# Patient Record
Sex: MALE | Race: BLACK OR AFRICAN AMERICAN | NOT HISPANIC OR LATINO | Employment: FULL TIME | URBAN - METROPOLITAN AREA
[De-identification: names, ages, dates, MRNs, and addresses within clinical notes are randomized per-mention and may not be internally consistent; named-entity substitution may affect disease eponyms.]

---

## 2017-04-05 ENCOUNTER — HOSPITAL ENCOUNTER (EMERGENCY)
Facility: HOSPITAL | Age: 27
Discharge: HOME | End: 2017-04-05
Attending: EMERGENCY MEDICINE

## 2017-04-05 VITALS
WEIGHT: 255 LBS | HEIGHT: 75 IN | OXYGEN SATURATION: 97 % | BODY MASS INDEX: 31.71 KG/M2 | RESPIRATION RATE: 18 BRPM | DIASTOLIC BLOOD PRESSURE: 83 MMHG | SYSTOLIC BLOOD PRESSURE: 147 MMHG | HEART RATE: 73 BPM | TEMPERATURE: 97 F

## 2017-04-05 DIAGNOSIS — R36.9 PENILE DISCHARGE: ICD-10-CM

## 2017-04-05 DIAGNOSIS — G43.909 MIGRAINE WITHOUT STATUS MIGRAINOSUS, NOT INTRACTABLE, UNSPECIFIED MIGRAINE TYPE: Primary | ICD-10-CM

## 2017-04-05 LAB
C TRACH DNA SPEC QL NAA+PROBE: NOT DETECTED
N GONORRHOEA DNA SPEC QL NAA+PROBE: DETECTED

## 2017-04-05 PROCEDURE — 25000003 PHARM REV CODE 250: Performed by: EMERGENCY MEDICINE

## 2017-04-05 PROCEDURE — 99283 EMERGENCY DEPT VISIT LOW MDM: CPT | Mod: 25

## 2017-04-05 PROCEDURE — 96372 THER/PROPH/DIAG INJ SC/IM: CPT

## 2017-04-05 PROCEDURE — 63600175 PHARM REV CODE 636 W HCPCS: Performed by: EMERGENCY MEDICINE

## 2017-04-05 PROCEDURE — 87591 N.GONORRHOEAE DNA AMP PROB: CPT

## 2017-04-05 RX ORDER — BUTALBITAL, ACETAMINOPHEN AND CAFFEINE 50; 325; 40 MG/1; MG/1; MG/1
2 TABLET ORAL
Status: COMPLETED | OUTPATIENT
Start: 2017-04-05 | End: 2017-04-05

## 2017-04-05 RX ORDER — BUTALBITAL, ACETAMINOPHEN AND CAFFEINE 50; 325; 40 MG/1; MG/1; MG/1
1 TABLET ORAL EVERY 4 HOURS PRN
Qty: 20 TABLET | Refills: 0 | Status: SHIPPED | OUTPATIENT
Start: 2017-04-05 | End: 2017-05-05

## 2017-04-05 RX ORDER — CEFTRIAXONE 250 MG/1
250 INJECTION, POWDER, FOR SOLUTION INTRAMUSCULAR; INTRAVENOUS
Status: COMPLETED | OUTPATIENT
Start: 2017-04-05 | End: 2017-04-05

## 2017-04-05 RX ORDER — AZITHROMYCIN 250 MG/1
1000 TABLET, FILM COATED ORAL
Status: COMPLETED | OUTPATIENT
Start: 2017-04-05 | End: 2017-04-05

## 2017-04-05 RX ADMIN — AZITHROMYCIN 1000 MG: 250 TABLET, FILM COATED ORAL at 06:04

## 2017-04-05 RX ADMIN — BUTALBITAL, ACETAMINOPHEN, AND CAFFEINE 2 TABLET: 50; 325; 40 TABLET ORAL at 05:04

## 2017-04-05 RX ADMIN — CEFTRIAXONE SODIUM 250 MG: 250 INJECTION, POWDER, FOR SOLUTION INTRAMUSCULAR; INTRAVENOUS at 06:04

## 2017-04-05 NOTE — DISCHARGE INSTRUCTIONS
Preventing Migraine Headaches: Medicines and Lifestyle Changes  A migraine is a type of severe headache. Having a migraine can be very painful. But there are steps you can take to help prevent migraines.    Medicines to help prevent migraines  · Your healthcare provider may prescribe certain medicines to help prevent migraines. These medicines may need to be taken daily. Or they may only need to be taken at times when youre likely to have a migraine.  · Common medicines used to help prevent migraines include:  ¨ Triptans (serotonin receptor agonists)  ¨ Nonsteroidal anti-inflammatory drugs (available over-the-counter)  ¨ Beta-blockers  ¨ Anticonvulsants  ¨ Tricyclic antidepressants  ¨ Calcium channel blockers  ¨ Certain vitamins, minerals, and plant extracts  ¨ Botulinum toxin injection (Botox) for certain chronic migraines   ¨ CGRP (calcitonin gene-related peptide) agnonists are being reviewed by the Food and Drug Administration (FDA)  Lifestyle changes for long-term prevention  Here are some suggestions:  · Exercise. Regular exercise can help prevent migraines and improve your health. (If exercise triggers your migraines, talk to your healthcare provider.)  · Keep regular habits. Dont skip or delay meals. Drink plenty of water. And go to bed and get up at about the same time each day. This includes weekends.  · Try alternative treatments. These are treatments that do not involve the use of medicines or surgery. They may help relieve symptoms and prevent migraines. Some treatment options include biofeedback and acupuncture. Ask your healthcare provider to tell you more about these treatments if you have questions.  · Limit caffeine. You may find that caffeine helps relieve pain during an attack. But too much caffeine can also trigger migraines. So, limit the amount of caffeine you consume.  Date Last Reviewed: 10/11/2015  © 4901-5728 [employer]. [address] [place] [address], [place] [address]. All  rights reserved. This information is not intended as a substitute for professional medical care. Always follow your healthcare professional's instructions.          Preventing Migraine Headaches: Triggers  The first step in preventing migraines is to learn what triggers them. You may then be able to control your triggers to avoid or reduce the severity of your migraines.     Know your triggers  Be aware that you may have more than one trigger, and that some triggers may work together. Common migraine triggers include:  · Food and nutrition. Skipping meals or not drinking enough water can trigger headaches. So can certain foods, such as caffeine, monosodium glutamate (MSG), aged cheese, or sausage.  · Alcohol. Red wine and other alcoholic beverages are common migraine triggers.  · Chemicals. Scents, cleaning products, gasoline, glue, perfume, and paint can be triggers. So can tobacco smoke, including secondhand smoke.  · Emotions. Stress can trigger headaches or make them worse once they begin.  · Sleep disruption. Staying up late, sleeping late, and traveling across time zones can disrupt your sleep cycle, triggering headaches.  · Hormones. Many women notice that migraines tend to happen at a certain point in their menstrual cycle. Birth control pills or hormone replacement therapy may also trigger migraines.  · Environment and weather. Air travel, changes in altitude, air pressure changes, hot sun, or bright or flashing lights can be triggers.    Control your triggers  These are some of the things you can do to try to control triggers:  · Avoid triggers if you can. For example, stay clear of alcohol and foods that trigger your headaches. Use unscented household products. Keep regular sleep habits. Manage stress to help control emotional triggers.  · Change your behavior at times when triggers can't be avoided. For example, make sure to get enough rest and drink plenty of water while you're traveling. Make sure to  carry a hat, sunglasses, and your medicines. Be alert for migraine symptoms, so you can treat a migraine early if it happens.  Date Last Reviewed: 10/9/2015  © 7966-0283 [employer]. [address] [place] [address], [place] [address]. All rights reserved. This information is not intended as a substitute for professional medical care. Always follow your healthcare professional's instructions.

## 2017-04-05 NOTE — ED PROVIDER NOTES
Encounter Date: 4/5/2017       History     Chief Complaint   Patient presents with    Migraine     pt. has hx of migraine headaches. c/o headache for several hours. did not take any medication. pain is frontal across forehead. associated symptom is photosensitivity.      Review of patient's allergies indicates:  No Known Allergies  HPI Comments: 26-year-old male with a history of migraines comes in the emergency department with headache that started several hours ago.  Headache is similar to all of his previous migraines, without any difference.  He denies vision changes, he denies fevers, neck stiffness, rash, chest pain or shortness of breath.    He also notes having penile discharge, he is sexually active is unsure whether or not he's had any STDs before.    Patient is a 26 y.o. male presenting with the following complaint: headaches. The history is provided by the patient.   Headache    This is a recurrent problem. The current episode started today. The problem occurs constantly. The problem has been unchanged. The pain is located in the temporal region. The pain does not radiate. The pain quality is similar to prior headaches. The quality of the pain is described as aching. The pain is at a severity of 4/10. The symptoms are aggravated by bright light. He has tried nothing for the symptoms. His past medical history is significant for migraine headaches.     Past Medical History:   Diagnosis Date    Hypercholesterolemia     Hypertension     Migraines, neuralgic      Past Surgical History:   Procedure Laterality Date    KNEE SURGERY  2008    left knee     Family History   Problem Relation Age of Onset    Hypertension Mother     Hyperlipidemia Maternal Grandmother     Hypertension Maternal Grandmother     Hypertension Paternal Grandmother      Social History   Substance Use Topics    Smoking status: Never Smoker    Smokeless tobacco: Never Used    Alcohol use Yes      Comment: on occasion     Review of  Systems   Constitutional: Negative.    Eyes: Negative.    Endocrine: Negative.    Genitourinary: Positive for discharge. Negative for penile pain, penile swelling and scrotal swelling.   Neurological: Positive for headaches.   All other systems reviewed and are negative.      Physical Exam   Initial Vitals   BP Pulse Resp Temp SpO2   04/05/17 0454 04/05/17 0454 04/05/17 0454 04/05/17 0454 04/05/17 0454   160/85 79 20 97.6 °F (36.4 °C) 99 %     Physical Exam    Nursing note and vitals reviewed.  Constitutional: He appears well-developed and well-nourished.   HENT:   Head: Normocephalic and atraumatic.   Eyes: EOM are normal. Pupils are equal, round, and reactive to light.   Neck: Normal range of motion. Neck supple.   Cardiovascular: Normal rate.   Pulmonary/Chest: Breath sounds normal.   Abdominal: Soft. Bowel sounds are normal.   Musculoskeletal: Normal range of motion.   Neurological: He is alert and oriented to person, place, and time.   Skin: Skin is warm and dry.   Psychiatric: He has a normal mood and affect. His behavior is normal. Thought content normal.         ED Course   Procedures  Labs Reviewed   C. TRACHOMATIS/N. GONORRHOEAE BY AMP DNA             Medical Decision Making:   Initial Assessment:   26-year-old male with a history of migraines here the headache very similar to his previous migraines with associated photophobia improved after getting Fioricet  Differential Diagnosis:   Differential diagnosis to include migraine, tension, cluster,  gonorrhea chlamydia, urethritis  ED Management:  Patient's headache improved drastically after getting Fioricet.  He was given empiric treatment for gonorrhea chlamydia.  Discharged with [facility] follow-up for further STD testing and treatment.                   ED Course     Clinical Impression:   The primary encounter diagnosis was Migraine without status migrainosus, not intractable, unspecified migraine type. A diagnosis of Penile discharge was also  pertinent to this visit.          Cony Solorzano MD  04/05/17 0682

## 2017-04-05 NOTE — ED NOTES
Pt discharged to home with home care and follow up instructions; rx x 1 given with med ed; pt aware he has a appt with his doctor on 4-7

## 2017-04-05 NOTE — ED AVS SNAPSHOT
[facility]  [address]  [place] [address]               Bernardo Byrne   2017  4:56 AM   ED    Description:  Male : 1990   Department:  [facility]           Your Care was Coordinated By:     Provider Role From To    Cony Solorzano MD Attending Provider 17 0519 --      Reason for Visit     Migraine           Diagnoses this Visit        Comments    Migraine without status migrainosus, not intractable, unspecified migraine type    -  Primary     Penile discharge           ED Disposition     ED Disposition Condition Comment    Discharge             To Do List           Follow-up Information     Follow up with Hamzah Miller MD In 1 week(s).    Specialty:  Family Medicine    Contact information:    [address] [place], [place] [address] [address] [place] [address]  [phone]         These Medications        Disp Refills Start End    butalbital-acetaminophen-caffeine -40 mg (FIORICET, ESGIC) -40 mg per tablet 20 tablet 0 2017    Take 1 tablet by mouth every 4 (four) hours as needed for Pain. - Oral    Pharmacy: [facility]/pharmacy #[id number] - [place] - [address] Ph #: [phone]         [facility]     [facility] -  Assistance  Unless otherwise directed by your provider, please contact [facility], our nurse care line that is available for  assistance.     Registered nurses in the [facility] provide: appointment scheduling, clinical advisement, health education, and other advisory services.  Call: [phone] (toll free)               Medications           Message regarding Medications     Verify the changes and/or additions to your medication regime listed below are the same as discussed with your clinician today.  If any of these changes or additions are incorrect, please notify your healthcare provider.        START taking these NEW medications        Refills     "butalbital-acetaminophen-caffeine -40 mg (FIORICET, ESGIC) -40 mg per tablet 0    Sig: Take 1 tablet by mouth every 4 (four) hours as needed for Pain.    Class: Print    Route: Oral      These medications were administered today        Dose Freq    butalbital-acetaminophen-caffeine -40 mg per tablet 2 tablet 2 tablet ED 1 Time    Sig: Take 2 tablets by mouth ED 1 Time.    Class: Normal    Route: Oral    cefTRIAXone injection 250 mg 250 mg ED 1 Time    Sig: Inject 250 mg into the muscle ED 1 Time.    Class: Normal    Route: Intramuscular    azithromycin tablet 1,000 mg 1,000 mg ED 1 Time    Sig: Take 4 tablets (1,000 mg total) by mouth ED 1 Time.    Class: Normal    Route: Oral           Verify that the below list of medications is an accurate representation of the medications you are currently taking.  If none reported, the list may be blank. If incorrect, please contact your healthcare provider. Carry this list with you in case of emergency.           Current Medications     topiramate (TOPAMAX) 25 MG tablet 1 po q hs x 7 days, then 2 po q hs x 7 days, then 3 po q hs x 7 days, then 4 po q hs    atorvastatin (LIPITOR) 10 MG tablet Take 1 tablet (10 mg total) by mouth once daily.    azithromycin tablet 1,000 mg Take 4 tablets (1,000 mg total) by mouth ED 1 Time.    butalbital-acetaminophen-caffeine -40 mg (FIORICET, ESGIC) -40 mg per tablet Take 1 tablet by mouth every 4 (four) hours as needed for Pain.    cefTRIAXone injection 250 mg Inject 250 mg into the muscle ED 1 Time.    metoprolol succinate (TOPROL-XL) 50 MG 24 hr tablet Take 1 tablet (50 mg total) by mouth once daily.    zolpidem (AMBIEN) 10 mg Tab Take 1 tablet (10 mg total) by mouth nightly as needed.           Clinical Reference Information           Your Vitals Were     BP Pulse Temp Resp Height Weight    160/85 (BP Location: Left arm, Patient Position: Sitting) 79 97.6 °F (36.4 °C) (Oral) 20 6' 3" (1.905 m) 115.7 kg (255 " lb)    SpO2 BMI             99% 31.87 kg/m2         Allergies as of 4/5/2017     No Known Allergies      Immunizations Administered on Date of Encounter - 4/5/2017     None      ED Micro, Lab, POCT     Start Ordered       Status Ordering Provider    04/05/17 0614 04/05/17 0613  C. trachomatis/N. gonorrhoeae by AMP DNA Urine  STAT      Ordered       ED Imaging Orders     None        Discharge Instructions           Preventing Migraine Headaches: Medicines and Lifestyle Changes  A migraine is a type of severe headache. Having a migraine can be very painful. But there are steps you can take to help prevent migraines.    Medicines to help prevent migraines  · Your healthcare provider may prescribe certain medicines to help prevent migraines. These medicines may need to be taken daily. Or they may only need to be taken at times when youre likely to have a migraine.  · Common medicines used to help prevent migraines include:  ¨ Triptans (serotonin receptor agonists)  ¨ Nonsteroidal anti-inflammatory drugs (available over-the-counter)  ¨ Beta-blockers  ¨ Anticonvulsants  ¨ Tricyclic antidepressants  ¨ Calcium channel blockers  ¨ Certain vitamins, minerals, and plant extracts  ¨ Botulinum toxin injection (Botox) for certain chronic migraines   ¨ CGRP (calcitonin gene-related peptide) agnonists are being reviewed by the Food and Drug Administration (FDA)  Lifestyle changes for long-term prevention  Here are some suggestions:  · Exercise. Regular exercise can help prevent migraines and improve your health. (If exercise triggers your migraines, talk to your healthcare provider.)  · Keep regular habits. Dont skip or delay meals. Drink plenty of water. And go to bed and get up at about the same time each day. This includes weekends.  · Try alternative treatments. These are treatments that do not involve the use of medicines or surgery. They may help relieve symptoms and prevent migraines. Some treatment options include  biofeedback and acupuncture. Ask your healthcare provider to tell you more about these treatments if you have questions.  · Limit caffeine. You may find that caffeine helps relieve pain during an attack. But too much caffeine can also trigger migraines. So, limit the amount of caffeine you consume.  Date Last Reviewed: 10/11/2015  © 9210-2968 [employer]. [address] [place] [address], [place] [address]. All rights reserved. This information is not intended as a substitute for professional medical care. Always follow your healthcare professional's instructions.          Preventing Migraine Headaches: Triggers  The first step in preventing migraines is to learn what triggers them. You may then be able to control your triggers to avoid or reduce the severity of your migraines.     Know your triggers  Be aware that you may have more than one trigger, and that some triggers may work together. Common migraine triggers include:  · Food and nutrition. Skipping meals or not drinking enough water can trigger headaches. So can certain foods, such as caffeine, monosodium glutamate (MSG), aged cheese, or sausage.  · Alcohol. Red wine and other alcoholic beverages are common migraine triggers.  · Chemicals. Scents, cleaning products, gasoline, glue, perfume, and paint can be triggers. So can tobacco smoke, including secondhand smoke.  · Emotions. Stress can trigger headaches or make them worse once they begin.  · Sleep disruption. Staying up late, sleeping late, and traveling across time zones can disrupt your sleep cycle, triggering headaches.  · Hormones. Many women notice that migraines tend to happen at a certain point in their menstrual cycle. Birth control pills or hormone replacement therapy may also trigger migraines.  · Environment and weather. Air travel, changes in altitude, air pressure changes, hot sun, or bright or flashing lights can be triggers.    Control your triggers  These are some of the things you  can do to try to control triggers:  · Avoid triggers if you can. For example, stay clear of alcohol and foods that trigger your headaches. Use unscented household products. Keep regular sleep habits. Manage stress to help control emotional triggers.  · Change your behavior at times when triggers can't be avoided. For example, make sure to get enough rest and drink plenty of water while you're traveling. Make sure to carry a hat, sunglasses, and your medicines. Be alert for migraine symptoms, so you can treat a migraine early if it happens.  Date Last Reviewed: 10/9/2015  © 5020-2762 [employer]. [address] [place] [address], [place] [address]. All rights reserved. This information is not intended as a substitute for professional medical care. Always follow your healthcare professional's instructions.          Discharge References/Attachments     STDS, UNDERSTANDING (ENGLISH)    STI, SUSPECTED (CULTURE ONLY), GONORRHEA, CHLAMYDIA (ENGLISH)    STDS, REDUCE YOUR RISKS FOR GETTING: FOR TEENS (ENGLISH)    STDS, WHAT TO KNOW ABOUT: FOR TEENS (ENGLISH)      Your Scheduled Appointments     Apr 07, 2017  2:45 PM CDT   Established Patient Visit with MD Raad Sanchez [facility]    [address] [place] [id number] [phone]              [facility] Sign-Up     Activating your [facility] account is as easy as 1-2-3!     1) Visit [email or web address], select Sign Up Now, enter this activation code and your date of birth, then select Next.  [email or web address]  Expires: 5/20/2017  6:19 AM      2) Create a username and password to use when you visit [facility] in the future and select a security question in case you lose your password and select Next.    3) Enter your e-mail address and click Sign Up!    Additional Information  If you have questions, please e-mail [email or web address] or call [phone] to talk to our [facility] staff. Remember, [facility] is NOT to be used for  urgent needs. For medical emergencies, dial 911.          [facility] complies with applicable Federal civil rights laws and does not discriminate on the basis of race, color, national origin, age, disability, or sex.        Language Assistance Services     ATTENTION: Language assistance services are available, free of charge. Please call [phone].      ATENCIÓN: Si habla español, tiene a moreira disposición servicios gratuitos de asistencia lingüística. Llame al [phone].     MALIK Ý: N?u b?n nói Ti?ng Vi?t, có các d?ch v? h? tr? ngôn ng? mi?n phí dành cho b?n. G?i s? [phone].

## 2017-04-05 NOTE — ED TRIAGE NOTES
Patient presents to ed with c/o a migraine headache and similar to his usual migraine; began last night and pain 7/10; pt took nothing; pt is 2 weeks out of his topamax; pt reports nothing else works so took no meds for the headache

## 2018-11-11 ENCOUNTER — HOSPITAL ENCOUNTER (EMERGENCY)
Facility: OTHER | Age: 28
Discharge: HOME | End: 2018-11-11
Attending: EMERGENCY MEDICINE

## 2018-11-11 VITALS
WEIGHT: 253 LBS | SYSTOLIC BLOOD PRESSURE: 191 MMHG | HEIGHT: 74 IN | OXYGEN SATURATION: 100 % | DIASTOLIC BLOOD PRESSURE: 93 MMHG | TEMPERATURE: 98 F | BODY MASS INDEX: 32.47 KG/M2 | RESPIRATION RATE: 16 BRPM | HEART RATE: 98 BPM

## 2018-11-11 DIAGNOSIS — M62.830 MUSCLE SPASM OF BACK: Primary | ICD-10-CM

## 2018-11-11 LAB
BACTERIA #/AREA URNS HPF: NORMAL /HPF
BILIRUB UR QL STRIP: NEGATIVE
CLARITY UR: CLEAR
COLOR UR: YELLOW
GLUCOSE UR QL STRIP: NEGATIVE
HGB UR QL STRIP: ABNORMAL
HYALINE CASTS #/AREA URNS LPF: 0 /LPF
KETONES UR QL STRIP: NEGATIVE
LEUKOCYTE ESTERASE UR QL STRIP: ABNORMAL
MICROSCOPIC COMMENT: NORMAL
NITRITE UR QL STRIP: NEGATIVE
PH UR STRIP: 7 [PH] (ref 5–8)
PROT UR QL STRIP: ABNORMAL
RBC #/AREA URNS HPF: 3 /HPF (ref 0–4)
SP GR UR STRIP: 1.01 (ref 1–1.03)
SQUAMOUS #/AREA URNS HPF: 1 /HPF
URN SPEC COLLECT METH UR: ABNORMAL
UROBILINOGEN UR STRIP-ACNC: ABNORMAL EU/DL
WBC #/AREA URNS HPF: 5 /HPF (ref 0–5)
WBC CLUMPS URNS QL MICRO: NORMAL
YEAST URNS QL MICRO: NORMAL

## 2018-11-11 PROCEDURE — 96372 THER/PROPH/DIAG INJ SC/IM: CPT

## 2018-11-11 PROCEDURE — 99284 EMERGENCY DEPT VISIT MOD MDM: CPT | Mod: 25

## 2018-11-11 PROCEDURE — 87491 CHLMYD TRACH DNA AMP PROBE: CPT

## 2018-11-11 PROCEDURE — 63600175 PHARM REV CODE 636 W HCPCS: Performed by: EMERGENCY MEDICINE

## 2018-11-11 PROCEDURE — 81000 URINALYSIS NONAUTO W/SCOPE: CPT

## 2018-11-11 RX ORDER — NAPROXEN 500 MG/1
500 TABLET ORAL 2 TIMES DAILY WITH MEALS
Qty: 14 TABLET | Refills: 0 | Status: SHIPPED | OUTPATIENT
Start: 2018-11-11 | End: 2018-11-18

## 2018-11-11 RX ORDER — ORPHENADRINE CITRATE 30 MG/ML
60 INJECTION INTRAMUSCULAR; INTRAVENOUS
Status: COMPLETED | OUTPATIENT
Start: 2018-11-11 | End: 2018-11-11

## 2018-11-11 RX ORDER — KETOROLAC TROMETHAMINE 30 MG/ML
15 INJECTION, SOLUTION INTRAMUSCULAR; INTRAVENOUS
Status: COMPLETED | OUTPATIENT
Start: 2018-11-11 | End: 2018-11-11

## 2018-11-11 RX ORDER — CYCLOBENZAPRINE HCL 10 MG
10 TABLET ORAL 3 TIMES DAILY PRN
Qty: 15 TABLET | Refills: 0 | Status: SHIPPED | OUTPATIENT
Start: 2018-11-11 | End: 2018-11-16

## 2018-11-11 RX ADMIN — KETOROLAC TROMETHAMINE 15 MG: 30 INJECTION, SOLUTION INTRAMUSCULAR at 02:11

## 2018-11-11 RX ADMIN — ORPHENADRINE CITRATE 60 MG: 30 INJECTION INTRAMUSCULAR; INTRAVENOUS at 02:11

## 2018-11-11 NOTE — ED PROVIDER NOTES
"Encounter Date: 11/11/2018       History     Chief Complaint   Patient presents with    Back Pain     Pt CO "back spasms straight down my spine" symptoms began 2 days ago, and worsened tonight.      Time seen by provider: 2:25 AM    This is a 28 y.o. Male, with history of migraines, HTN, and hypercholesterolemia, who presents with complaint of spasms throughout his entire back starting two days ago.  He reports that the pain has progressively worsened.  He denies fever, chills, sore throat, SOB, CP, abdominal pain, N/V/D, urinary or bowel incontinence, neck pain, headache, or weakness.  The patient denies injuries or falls.  He reports that he has a history of spasms, but they have never been this painful.  He has NKDA.  He is driving himself tonight.      The history is provided by the patient.     Review of patient's allergies indicates:   Allergen Reactions    Mushroom Anaphylaxis     Past Medical History:   Diagnosis Date    Hypercholesterolemia     Hypertension     Migraines, neuralgic      Past Surgical History:   Procedure Laterality Date    KNEE SURGERY  2008    left knee     Family History   Problem Relation Age of Onset    Hypertension Mother     Hyperlipidemia Maternal Grandmother     Hypertension Maternal Grandmother     Hypertension Paternal Grandmother      Social History     Tobacco Use    Smoking status: Never Smoker    Smokeless tobacco: Never Used   Substance Use Topics    Alcohol use: Yes     Comment: on occasion    Drug use: No     Review of Systems    Physical Exam     Initial Vitals [11/11/18 0221]   BP Pulse Resp Temp SpO2   (!) 191/93 98 16 97.5 °F (36.4 °C) 100 %      MAP       --         Physical Exam    Nursing note and vitals reviewed.  Constitutional: He appears well-developed and well-nourished. He is not diaphoretic. No distress.   Neurological:   His gait is normal.  Bilateral lumbar paraspinal tenderness to palaption         ED Course   Procedures  Labs Reviewed - No " data to display       Imaging Results    None                               Clinical Impression:   No diagnosis found.

## 2018-11-11 NOTE — ED PROVIDER NOTES
"Encounter Date: 11/11/2018       History     Chief Complaint   Patient presents with    Back Pain     Pt CO "back spasms straight down my spine" symptoms began 2 days ago, and worsened tonight.      Time seen by provider: 2:25 AM    This is a 28 y.o. male, with history of migraines, HTN, and hypercholesterolemia, who presents with complaint of spasms throughout his entire back starting two days ago.  Patient states that the gets these from time to time.  He denies fever, chills, sore throat, SOB, CP, abdominal pain, N/V/D, urinary or bowel incontinence, neck pain, headache, or weakness.  The patient denies injuries or falls.  No interventions attempted at home.            Review of patient's allergies indicates:   Allergen Reactions    Mushroom Anaphylaxis     Past Medical History:   Diagnosis Date    Hypercholesterolemia     Hypertension     Migraines, neuralgic      Past Surgical History:   Procedure Laterality Date    KNEE SURGERY  2008    left knee     Family History   Problem Relation Age of Onset    Hypertension Mother     Hyperlipidemia Maternal Grandmother     Hypertension Maternal Grandmother     Hypertension Paternal Grandmother      Social History     Tobacco Use    Smoking status: Never Smoker    Smokeless tobacco: Never Used   Substance Use Topics    Alcohol use: Yes     Comment: on occasion    Drug use: No     Review of Systems   Constitutional: Negative for chills and fever.   HENT: Negative for sore throat.    Respiratory: Negative for shortness of breath.    Cardiovascular: Negative for chest pain.   Gastrointestinal: Negative for abdominal pain, diarrhea, nausea and vomiting.   Genitourinary: Negative for decreased urine volume, difficulty urinating, dysuria, enuresis, frequency, hematuria and urgency.   Musculoskeletal: Positive for back pain (spasms).   Skin: Negative for color change, pallor and rash.   Neurological: Negative for weakness and headaches.   Hematological: Negative " for adenopathy. Does not bruise/bleed easily.       Physical Exam     Initial Vitals [11/11/18 0221]   BP Pulse Resp Temp SpO2   (!) 191/93 98 16 97.5 °F (36.4 °C) 100 %      MAP       --         Physical Exam    Nursing note and vitals reviewed.  Constitutional: He appears well-developed and well-nourished. He is not diaphoretic. No distress.   HENT:   Head: Normocephalic and atraumatic.   Eyes: Conjunctivae and EOM are normal. No scleral icterus.   Cardiovascular: Normal rate, regular rhythm and normal heart sounds. Exam reveals no gallop and no friction rub.    No murmur heard.  Pulmonary/Chest: Breath sounds normal. No respiratory distress. He has no wheezes. He has no rhonchi. He has no rales.   Neurological:   His gait is normal.  Bilateral lumbar paraspinal tenderness to palpation. Diffuse thoracic midline spinal tenderness to palpation.   Skin: Skin is warm and dry. No rash noted. No erythema. No pallor.   Psychiatric: He has a normal mood and affect. His behavior is normal. Judgment and thought content normal.         ED Course   Procedures  Labs Reviewed   URINALYSIS, REFLEX TO URINE CULTURE - Abnormal; Notable for the following components:       Result Value    Protein, UA 2+ (*)     Occult Blood UA Trace (*)     Urobilinogen, UA 2.0-3.0 (*)     Leukocytes, UA 1+ (*)     All other components within normal limits    Narrative:     Preferred Collection Type->Urine, Clean Catch   C. TRACHOMATIS/N. GONORRHOEAE BY AMP DNA   URINALYSIS MICROSCOPIC    Narrative:     Preferred Collection Type->Urine, Clean Catch          Imaging Results    None          Medical Decision Making:   Clinical Tests:   Lab Tests: Ordered and Reviewed    Additional MDM:   Comments: 28-year-old male presents complaining of generalized back pain consistent with his previous muscle spasms.  On exam he has tenderness in the lumbar paraspinal region bilaterally as well as midline thoracic region diffusely.  Pain is aggravated by movement.   "Physical exam is otherwise unremarkable.  He has no signs or symptoms concerning for cord compression.  No imaging indicated at this time.  He received Norflex and Toradol given in the emergency department and a prescription for Flexeril and naproxen given for home.  PCP follow-up if symptoms do not improve with these interventions.    3:18 AM  At the time of discharge the patient reported having urine "leaking".  I went to reassess the patient and he stated he saw just a drop of urine when he was getting dressed.  There is no actual urinary incontinence.  However, he does report some "discomfort" and would like his urine checked.  My suspicion for urinary tract infection is low as he states that his symptoms started only after he was given the IM injections.  However, urinalysis and urine GC will be sent.    3:23 AM  Urinalysis is positive for 1+ leukocytes only.  Will wait for urine culture..                    Clinical Impression:     1. Muscle spasm of back                                   Ana Brock MD  11/11/18 0323    "

## 2018-11-11 NOTE — ED TRIAGE NOTES
"Pt CO "muscle spasms down my spine". Pt states the symptoms began 2 days ago, and denies, heavy lifting, or trauma. PT denies any numbness, tingling, or leg weakness.   "

## 2018-11-11 NOTE — ED NOTES
"Upon attempting to DC patient he reports "I started leaking urine while I was getting dressed". MD notified, and to bedside.  "

## 2018-11-12 LAB
C TRACH DNA SPEC QL NAA+PROBE: DETECTED
N GONORRHOEA DNA SPEC QL NAA+PROBE: NOT DETECTED

## 2019-06-18 ENCOUNTER — HOSPITAL ENCOUNTER (EMERGENCY)
Facility: HOSPITAL | Age: 29
Discharge: HOME | End: 2019-06-19
Attending: EMERGENCY MEDICINE

## 2019-06-18 VITALS
DIASTOLIC BLOOD PRESSURE: 90 MMHG | WEIGHT: 260 LBS | SYSTOLIC BLOOD PRESSURE: 167 MMHG | HEIGHT: 74 IN | OXYGEN SATURATION: 96 % | HEART RATE: 80 BPM | TEMPERATURE: 98 F | BODY MASS INDEX: 33.37 KG/M2 | RESPIRATION RATE: 20 BRPM

## 2019-06-18 DIAGNOSIS — M54.50 ACUTE RIGHT-SIDED LOW BACK PAIN WITHOUT SCIATICA: Primary | ICD-10-CM

## 2019-06-18 DIAGNOSIS — N30.00 ACUTE CYSTITIS WITHOUT HEMATURIA: ICD-10-CM

## 2019-06-18 DIAGNOSIS — R36.9 PENILE DISCHARGE: ICD-10-CM

## 2019-06-18 LAB
BACTERIA #/AREA URNS HPF: ABNORMAL /HPF
BILIRUB UR QL STRIP: NEGATIVE
CLARITY UR: CLEAR
COLOR UR: YELLOW
GLUCOSE UR QL STRIP: NEGATIVE
HGB UR QL STRIP: NEGATIVE
HYALINE CASTS #/AREA URNS LPF: 2 /LPF
KETONES UR QL STRIP: NEGATIVE
LEUKOCYTE ESTERASE UR QL STRIP: ABNORMAL
MICROSCOPIC COMMENT: ABNORMAL
NITRITE UR QL STRIP: NEGATIVE
PH UR STRIP: 6 [PH] (ref 5–8)
PROT UR QL STRIP: ABNORMAL
RBC #/AREA URNS HPF: 3 /HPF (ref 0–4)
SP GR UR STRIP: 1.02 (ref 1–1.03)
URN SPEC COLLECT METH UR: ABNORMAL
UROBILINOGEN UR STRIP-ACNC: 1 EU/DL
WBC #/AREA URNS HPF: 8 /HPF (ref 0–5)
WBC CLUMPS URNS QL MICRO: ABNORMAL

## 2019-06-18 PROCEDURE — 25000003 PHARM REV CODE 250: Performed by: EMERGENCY MEDICINE

## 2019-06-18 PROCEDURE — 87086 URINE CULTURE/COLONY COUNT: CPT

## 2019-06-18 PROCEDURE — 63600175 PHARM REV CODE 636 W HCPCS: Performed by: EMERGENCY MEDICINE

## 2019-06-18 PROCEDURE — 96372 THER/PROPH/DIAG INJ SC/IM: CPT | Mod: 59

## 2019-06-18 PROCEDURE — 99284 EMERGENCY DEPT VISIT MOD MDM: CPT | Mod: 25

## 2019-06-18 PROCEDURE — 81000 URINALYSIS NONAUTO W/SCOPE: CPT

## 2019-06-18 RX ORDER — CEFTRIAXONE 250 MG/1
250 INJECTION, POWDER, FOR SOLUTION INTRAMUSCULAR; INTRAVENOUS
Status: COMPLETED | OUTPATIENT
Start: 2019-06-18 | End: 2019-06-18

## 2019-06-18 RX ORDER — AZITHROMYCIN 250 MG/1
1000 TABLET, FILM COATED ORAL
Status: COMPLETED | OUTPATIENT
Start: 2019-06-18 | End: 2019-06-18

## 2019-06-18 RX ORDER — DOXYCYCLINE 100 MG/1
100 CAPSULE ORAL 2 TIMES DAILY
Qty: 20 CAPSULE | Refills: 0 | Status: SHIPPED | OUTPATIENT
Start: 2019-06-18 | End: 2019-06-28

## 2019-06-18 RX ORDER — KETOROLAC TROMETHAMINE 30 MG/ML
30 INJECTION, SOLUTION INTRAMUSCULAR; INTRAVENOUS
Status: COMPLETED | OUTPATIENT
Start: 2019-06-18 | End: 2019-06-18

## 2019-06-18 RX ADMIN — CEFTRIAXONE SODIUM 250 MG: 250 INJECTION, POWDER, FOR SOLUTION INTRAMUSCULAR; INTRAVENOUS at 11:06

## 2019-06-18 RX ADMIN — KETOROLAC TROMETHAMINE 30 MG: 30 INJECTION, SOLUTION INTRAMUSCULAR at 11:06

## 2019-06-18 RX ADMIN — AZITHROMYCIN MONOHYDRATE 1000 MG: 250 TABLET ORAL at 11:06

## 2019-06-19 NOTE — ED PROVIDER NOTES
Encounter Date: 6/18/2019    SCRIBE #1 NOTE: I, Brad Willett , am scribing for, and in the presence of,  Dr. Berry. I have scribed the entire note.       History     Chief Complaint   Patient presents with    Flank Pain     reports right sided flank pain for the past 2 days. pt also reports white penile discharge for the past 2days. denies dysuria or fevers. reports unprotected sex 1 month ago.      28 y/o M presents to the ER c/o right low back pain and penile discharge. States the back pain began a few days ago. Denies any injury. Reports a mild, stabbing low back pain worse with certain positions and movements and without alleviating factors. Has not taken any medication for these symptoms. Also reports some occasional white penile discharge for the last few days. Notes recent unprotected sexual encounters. Denies any fever, N/V, urinary frequency/urgency. No other symptoms reported.     The history is provided by the patient.     Review of patient's allergies indicates:   Allergen Reactions    Mushroom Anaphylaxis     Past Medical History:   Diagnosis Date    Hypercholesterolemia     Hypertension     Migraines, neuralgic      Past Surgical History:   Procedure Laterality Date    KNEE SURGERY  2008    left knee     Family History   Problem Relation Age of Onset    Hypertension Mother     Hyperlipidemia Maternal Grandmother     Hypertension Maternal Grandmother     Hypertension Paternal Grandmother      Social History     Tobacco Use    Smoking status: Never Smoker    Smokeless tobacco: Never Used   Substance Use Topics    Alcohol use: Yes     Comment: on occasion    Drug use: No     Review of Systems   Constitutional: Negative for chills, fatigue and fever.   HENT: Negative for congestion, ear pain, sore throat and voice change.    Eyes: Negative for photophobia, pain and redness.   Respiratory: Negative for cough, choking and shortness of breath.    Cardiovascular: Negative for chest pain,  palpitations and leg swelling.   Gastrointestinal: Negative for abdominal pain, diarrhea, nausea and vomiting.   Genitourinary: Positive for discharge. Negative for dysuria, frequency and urgency.   Musculoskeletal: Positive for back pain. Negative for neck pain and neck stiffness.   Neurological: Negative for light-headedness, numbness and headaches.   All other systems reviewed and are negative.      Physical Exam     Initial Vitals [06/18/19 2245]   BP Pulse Resp Temp SpO2   (!) 170/97 87 20 98.1 °F (36.7 °C) 96 %      MAP       --         Physical Exam    Nursing note and vitals reviewed.  Constitutional: He appears well-developed and well-nourished. No distress.   HENT:   Head: Normocephalic and atraumatic.   Oropharynx clear; Dry MM   Eyes: Conjunctivae and EOM are normal. Pupils are equal, round, and reactive to light.   Neck: Normal range of motion. Neck supple. No tracheal deviation present.   Cardiovascular: Normal rate, regular rhythm, normal heart sounds and intact distal pulses.   Pulmonary/Chest: Breath sounds normal. No respiratory distress. He has no wheezes. He has no rhonchi. He has no rales.   Abdominal: Soft. Bowel sounds are normal. He exhibits no distension. There is no tenderness.   Musculoskeletal: Normal range of motion. He exhibits no edema or tenderness.   Neurological: He is alert and oriented to person, place, and time. He has normal strength. No cranial nerve deficit.   Skin: Skin is warm and dry. Capillary refill takes less than 2 seconds.         ED Course   Procedures  Labs Reviewed   C. TRACHOMATIS/N. GONORRHOEAE BY AMP DNA   URINALYSIS, REFLEX TO URINE CULTURE   URINALYSIS MICROSCOPIC          Imaging Results    None          Medical Decision Making:   Initial Assessment:   This is a 29 y.o. male who presents with complaint of Flank Pain.  Differential Diagnosis:   UTI, pyelonephritis, kidney stone, STD, musculoskeletal back pain  Clinical Tests:   Lab Tests: Reviewed       <>  Summary of Lab: GC/Chlamydia pending; Urine sent for culture  ED Management:  Px treated with rocephin 250mg IM, azithromycin 1,000 mg PO, and toradol 30 IM. Repeat BP much improved, initial likely secondary to triage BP and pain. Px reports improvement in back pain. Instructed on home mgmt, to increase oral hydration, reasons to return to the ER.                       Clinical Impression:     1. Acute right-sided low back pain without sciatica    2. Penile discharge    3. Acute cystitis without hematuria      Disposition:   Disposition: Discharged  Condition: Stable    Scribe Attestation I, Dr. Keagan Berry, personally performed the services described in this documentation. All medical record entries made by the scribe were at my direction and in my presence.  I have reviewed the chart and agree that the record reflects my personal performance and is accurate and complete. Keagan Berry MD.  11:37 PM 06/18/2019                        Keagan Berry MD  06/18/19 8448

## 2019-06-19 NOTE — ED TRIAGE NOTES
Patient presents to the ED with reports of having right sided back/flank pain with penile discharge that started approxiamtely x 2 days. States having hx of unprotected sex. Denies any nausea, vomiting, diarrhea, fever, or chills.    Review of patient's allergies indicates:   Allergen Reactions    Mushroom Anaphylaxis        Patient has verified the spelling of their name and  on armband.   APPEARANCE: Patient is alert, calm, oriented x 4, and does not appear distressed.  SKIN: Skin is normal for race, warm, and dry. Normal skin turgor and mucous membranes moist.  CARDIAC: Normal rate and no murmur heard.   RESPIRATORY:Normal rate and effort. Breath sounds clear bilaterally throughout chest. Respirations are equal and unlabored.    GASTRO: Bowel sounds normal, abdomen is soft, no tenderness, and no abdominal distention.  MUSCLE: Full ROM. Right sided back pain. No obvious deformity.  : +Discharge

## 2019-06-19 NOTE — DISCHARGE INSTRUCTIONS
Please make sure you are drinking plenty of fluids, such as gatorade g2 or powerade light. In the morning I recommend you begin taking ibuprofen 600mg every 8 hours as needed for pain.

## 2019-06-20 LAB — BACTERIA UR CULT: NO GROWTH

## 2021-02-21 PROBLEM — A64 STD (MALE): Status: ACTIVE | Noted: 2021-02-21

## 2022-10-19 ENCOUNTER — APPOINTMENT (OUTPATIENT)
Dept: RADIOLOGY | Facility: HOSPITAL | Age: 32
End: 2022-10-19
Payer: COMMERCIAL

## 2022-10-19 ENCOUNTER — HOSPITAL ENCOUNTER (EMERGENCY)
Facility: HOSPITAL | Age: 32
Discharge: HOME | End: 2022-10-20
Attending: EMERGENCY MEDICINE
Payer: COMMERCIAL

## 2022-10-19 DIAGNOSIS — M25.559 HIP PAIN: ICD-10-CM

## 2022-10-19 DIAGNOSIS — S09.90XA INJURY OF HEAD, INITIAL ENCOUNTER: Primary | ICD-10-CM

## 2022-10-19 PROCEDURE — 99284 EMERGENCY DEPT VISIT MOD MDM: CPT | Mod: 25

## 2022-10-19 PROCEDURE — 99284 PR EMERGENCY DEPT VISIT,LEVEL IV: ICD-10-PCS | Mod: ,,,

## 2022-10-19 PROCEDURE — 25000003 PHARM REV CODE 250

## 2022-10-19 PROCEDURE — 73552 X-RAY EXAM OF FEMUR 2/>: CPT | Mod: 26,RT,, | Performed by: RADIOLOGY

## 2022-10-19 PROCEDURE — 99284 EMERGENCY DEPT VISIT MOD MDM: CPT | Mod: ,,,

## 2022-10-19 PROCEDURE — 73552 X-RAY EXAM OF FEMUR 2/>: CPT | Mod: TC,RT

## 2022-10-19 PROCEDURE — 73552 XR FEMUR 2 VIEW RIGHT: ICD-10-PCS | Mod: 26,RT,, | Performed by: RADIOLOGY

## 2022-10-19 RX ORDER — HYDROCODONE BITARTRATE AND ACETAMINOPHEN 5; 325 MG/1; MG/1
1 TABLET ORAL
Status: COMPLETED | OUTPATIENT
Start: 2022-10-19 | End: 2022-10-19

## 2022-10-19 RX ADMIN — HYDROCODONE BITARTRATE AND ACETAMINOPHEN 1 TABLET: 5; 325 TABLET ORAL at 10:10

## 2022-10-19 NOTE — Clinical Note
"Bernardo Medina" Caty was seen and treated in our emergency department on 10/19/2022.  He may return to work on 10/24/2022.       If you have any questions or concerns, please don't hesitate to call.      Oscar Wilkes, DO"

## 2022-10-20 VITALS
DIASTOLIC BLOOD PRESSURE: 99 MMHG | WEIGHT: 265 LBS | TEMPERATURE: 97 F | HEART RATE: 72 BPM | OXYGEN SATURATION: 98 % | SYSTOLIC BLOOD PRESSURE: 161 MMHG | BODY MASS INDEX: 33.12 KG/M2 | RESPIRATION RATE: 18 BRPM

## 2022-10-20 RX ORDER — METHOCARBAMOL 500 MG/1
500 TABLET, FILM COATED ORAL 2 TIMES DAILY PRN
Qty: 5 TABLET | Refills: 0 | Status: SHIPPED | OUTPATIENT
Start: 2022-10-20

## 2022-10-20 RX ORDER — NAPROXEN 500 MG/1
500 TABLET ORAL 2 TIMES DAILY WITH MEALS
Qty: 30 TABLET | Refills: 0 | Status: SHIPPED | OUTPATIENT
Start: 2022-10-20

## 2022-10-20 NOTE — ED TRIAGE NOTES
Bernardo Byrne, a 32 y.o. male presents to the ED w/ complaint of fall yesterday. Pt hit back of head yesterday. Headache started today, took Excedrin at 5pm with no relief. +photophobia     Triage note:  Chief Complaint   Patient presents with    Fall     Pt had a fall yesterday at work. Pt hit back of head. -LOC, -wounds noted. Pt is AAOx4, GCS of 15 at this time.      Review of patient's allergies indicates:   Allergen Reactions    Mushroom Anaphylaxis     Past Medical History:   Diagnosis Date    Hypercholesterolemia     Hypertension     Migraines, neuralgic

## 2022-10-20 NOTE — DISCHARGE INSTRUCTIONS
After physical exam and imaging no fractures dislocations brain bleeding seen    Feel most likely experiencing pain after fall manage with over-the-counter medications or anti-inflammatories that I have prescribed.  I have also included 5 doses  of muscle relaxants to not drive work or exert yourself as you can become drowsy    Activity as tolerated continue to take all medication as prescribed    If you develop worsening symptoms such as nausea vomiting fever or generalized weakness she return to the emergency department

## 2022-10-20 NOTE — ED PROVIDER NOTES
Encounter Date: 10/19/2022       History     Chief Complaint   Patient presents with    Fall     Pt had a fall yesterday at work. Pt hit back of head. -LOC, -wounds noted. Pt is AAOx4, GCS of 15 at this time.      32-year-old male with hypertension on lisinopril presents due to head pain after fall that occurred yesterday.  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  He does not believe he lost consciousness but it took him a few seconds to ambulate back on his feet.  He he has since began to developed a occipital headache as well as sensitivity to bright lights.  He has taken Excedrin at home for his headache without any relief of symptoms.  Patient reports a history of migraines however have been primary frontal.  The patient also endorses some right hip pain since the fall causing him to limp, as well as some bruising that is noted.  He denies fever double vision nausea vomiting urinary incontinence or numbness.     Review of patient's allergies indicates:   Allergen Reactions    Mushroom Anaphylaxis     Past Medical History:   Diagnosis Date    Hypercholesterolemia     Hypertension     Migraines, neuralgic      Past Surgical History:   Procedure Laterality Date    KNEE SURGERY  2008    left knee     Family History   Problem Relation Age of Onset    Hypertension Mother     Hyperlipidemia Maternal Grandmother     Hypertension Maternal Grandmother     Hypertension Paternal Grandmother      Social History     Tobacco Use    Smoking status: Never    Smokeless tobacco: Never   Substance Use Topics    Alcohol use: Yes     Comment: on occasion    Drug use: No     Review of Systems   Constitutional:  Negative for fever.   HENT:  Negative for congestion and sore throat.    Eyes:  Positive for photophobia. Negative for pain, redness and visual disturbance.   Respiratory:  Negative for shortness of breath.    Cardiovascular:  Negative  for chest pain.   Gastrointestinal:  Negative for nausea.   Genitourinary:  Negative for dysuria.   Musculoskeletal:  Positive for back pain and myalgias. Negative for joint swelling.   Skin:  Negative for rash.   Neurological:  Positive for headaches (occipital). Negative for weakness.   Hematological:  Does not bruise/bleed easily.     Physical Exam     Initial Vitals [10/19/22 2213]   BP Pulse Resp Temp SpO2   (!) 209/107 86 16 97.9 °F (36.6 °C) 100 %      MAP       --         Physical Exam    Constitutional: He appears well-developed and well-nourished. He is not diaphoretic. No distress.   HENT:   Head: Normocephalic. Head is without raccoon's eyes and without Pena's sign.   Right Ear: Tympanic membrane and external ear normal.   Left Ear: Tympanic membrane and external ear normal.   Scalp is tender to palpation in occipital region   No post auricular ecchymosis  No TM hemorrhage        Eyes: Conjunctivae and EOM are normal. Pupils are equal, round, and reactive to light. Right eye exhibits no discharge. Left eye exhibits no discharge.   Neck:   Normal range of motion.  Cardiovascular:  Normal rate, regular rhythm and normal heart sounds.           Pulmonary/Chest: Breath sounds normal. No respiratory distress. He has no wheezes. He has no rales.   Abdominal: Abdomen is soft. He exhibits no distension. There is no abdominal tenderness.   Musculoskeletal:         General: Tenderness (right hip and leg) present.      Cervical back: Normal range of motion.     Neurological: He is alert and oriented to person, place, and time. He has normal strength.   Skin: Skin is warm and dry.   Psychiatric: He has a normal mood and affect. Thought content normal.       ED Course   Procedures  Labs Reviewed - No data to display       Imaging Results               CT Head Without Contrast (Final result)  Result time 10/20/22 00:06:12      Final result by Cathy Zhao MD (10/20/22 00:06:12)                   Impression:       1. No CT evidence of acute intracranial abnormality. Clinical correlation and further evaluation as warranted.  2. Partial opacification of the right mastoid air cells.  In light of patient's history of reported trauma, consider further evaluation with dedicated temporal bone CT.      Electronically signed by: Cathy Zhao MD  Date:    10/20/2022  Time:    00:06               Narrative:    EXAMINATION:  CT HEAD WITHOUT CONTRAST    CLINICAL HISTORY:  Head trauma, skull fracture or hematoma (Age 19-64y);    TECHNIQUE:  Low dose axial images were obtained through the head.  Coronal and sagittal reformations were also performed. Contrast was not administered.    COMPARISON:  None.    FINDINGS:  There is no acute intracranial hemorrhage, hydrocephalus, midline shift or mass effect. Gray-white matter differentiation appears maintained. The basal cisterns are patent. The prominence of the visualized posterior nasopharyngeal soft tissues which could reflect head merna hypertrophy in a patient of this chronologic age although correlation with physical exam/direct visualization is advised.  The visualized paranasal sinuses and left mastoid air cells are clear. There is partial opacification of the inferior right mastoid air cells.  In light of patient's reported history of trauma consider further evaluation with dedicated temporal bone CT.  There is nonspecific prominence of posterior nasopharyngeal soft tissues.    This report was flagged in Epic as abnormal.                                       X-Ray Femur 2 View Right (Final result)  Result time 10/19/22 23:13:31      Final result by Aaron Bowden MD (10/19/22 23:13:31)                   Impression:      There is no evidence of fracture or subluxation.      Electronically signed by: Aaron Bowden MD  Date:    10/19/2022  Time:    23:13               Narrative:    EXAMINATION:  XR FEMUR 2 VIEW RIGHT    CLINICAL HISTORY:  pain;    TECHNIQUE:  AP and lateral views  of the right femur were performed.    COMPARISON:  None    FINDINGS:  No fractures or dislocations.  Unremarkable visualized bony structures.                                       X-Ray Pelvis Complete min 3 views (Final result)  Result time 10/19/22 23:24:50      Final result by Aaron Bowden MD (10/19/22 23:24:50)                   Impression:      No displaced pelvic fracture.      Electronically signed by: Aaron Bowden MD  Date:    10/19/2022  Time:    23:24               Narrative:    EXAMINATION:  XR PELVIS COMPLETE MIN 3 VIEWS    CLINICAL HISTORY:  Pain in unspecified hip    TECHNIQUE:  AP, inlet and outlet views of the pelvis were performed.    COMPARISON:  None.    FINDINGS:  No displaced pelvic fracture identified.  Accessory os acetabuli present.  Small sclerotic bone island seen bilaterally.  No dislocation.                                       Medications   HYDROcodone-acetaminophen 5-325 mg per tablet 1 tablet (1 tablet Oral Given 10/19/22 9148)           APC / Resident Notes:   32 y.o. year old male presenting post fall.  On exam patient is afebrile and nontoxic. HEENT exam wnl, no garcia sign. Heart rate and rhythm are regular. Lungs with clear breath sounds throughout. Abdomen is soft, nontender. No joint deformity, scalp wounds or abrasions seen. PT has tenderness to right hip with some bruising. He is able to ambulate    DDx includes but is not limited to fracture, dislocation, intracranial abnormalities     ED workup reveals Right pelvis and femur x-ray performed however low suspicion due to full ROM and ability to ambulate. No fx or dislocation seen  CT head performed - pt complained of sensitivity to  bright light with tenderness to occipital region of scalp                                      No suspicion of intracranial bleed and skull fxs - partial opacification of inferior right mastoid process. PT denies tenderness to mastoid process or direct injury to area. TM normal w/o  hemorrhage. Pt can follow up out pt with primary care provider.     Plan PT discharged with naproxen, and Robaxin  and told to rest over the next few days. We discussed return precaution and he was given information on minor head trauma     Discussed findings and plan with patient who verbalized understanding and agrees with the plan and course of treatment. Return to ED precautions discussed. Patient is stable for discharge. I discussed the care of this patient with my supervising physician.                      Clinical Impression:   Final diagnoses:  [M25.559] Hip pain  [S09.90XA] Injury of head, initial encounter (Primary)        ED Disposition Condition    Discharge Stable          ED Prescriptions       Medication Sig Dispense Start Date End Date Auth. Provider    naproxen (NAPROSYN) 500 MG tablet Take 1 tablet (500 mg total) by mouth 2 (two) times daily with meals. 30 tablet 10/20/2022 -- Sophie Morse PA-C    methocarbamoL (ROBAXIN) 500 MG Tab Take 1 tablet (500 mg total) by mouth 2 (two) times daily as needed (pain). 5 tablet 10/20/2022 -- Sophie Morse PA-C          Follow-up Information       Follow up With Specialties Details Why Contact Info    Hamzah Miller MD Family Medicine In 1 week As needed [address] [id number] [phone]               Sophie Morse PA-C  10/20/22 0154

## 2022-10-25 ENCOUNTER — HOSPITAL ENCOUNTER (EMERGENCY)
Facility: HOSPITAL | Age: 32
Discharge: HOME | End: 2022-10-25
Attending: EMERGENCY MEDICINE
Payer: COMMERCIAL

## 2022-10-25 VITALS
BODY MASS INDEX: 33.57 KG/M2 | WEIGHT: 270 LBS | TEMPERATURE: 99 F | RESPIRATION RATE: 16 BRPM | HEART RATE: 78 BPM | OXYGEN SATURATION: 97 % | HEIGHT: 75 IN | DIASTOLIC BLOOD PRESSURE: 94 MMHG | SYSTOLIC BLOOD PRESSURE: 162 MMHG
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
## 2022-10-25 DIAGNOSIS — S30.1XXA CONTUSION OF ABDOMINAL WALL, INITIAL ENCOUNTER: Primary | ICD-10-CM

## 2022-10-25 LAB
ALBUMIN SERPL BCP-MCNC: 4.4 G/DL (ref 3.5–5.2)
ALP SERPL-CCNC: 88 U/L (ref 55–135)
ALT SERPL W/O P-5'-P-CCNC: 25 U/L (ref 10–44)
ANION GAP SERPL CALC-SCNC: 10 MMOL/L (ref 8–16)
AST SERPL-CCNC: 26 U/L (ref 10–40)
BACTERIA #/AREA URNS AUTO: NORMAL /HPF
BASOPHILS # BLD AUTO: 0.04 K/UL (ref 0–0.2)
BASOPHILS NFR BLD: 0.8 % (ref 0–1.9)
BILIRUB SERPL-MCNC: 0.6 MG/DL (ref 0.1–1)
BILIRUB UR QL STRIP: NEGATIVE
BUN SERPL-MCNC: 9 MG/DL (ref 6–20)
CALCIUM SERPL-MCNC: 9.5 MG/DL (ref 8.7–10.5)
CHLORIDE SERPL-SCNC: 100 MMOL/L (ref 95–110)
CLARITY UR REFRACT.AUTO: CLEAR
CO2 SERPL-SCNC: 25 MMOL/L (ref 23–29)
COLOR UR AUTO: YELLOW
CREAT SERPL-MCNC: 1.2 MG/DL (ref 0.5–1.4)
DIFFERENTIAL METHOD: ABNORMAL
EOSINOPHIL # BLD AUTO: 0.1 K/UL (ref 0–0.5)
EOSINOPHIL NFR BLD: 2.1 % (ref 0–8)
ERYTHROCYTE [DISTWIDTH] IN BLOOD BY AUTOMATED COUNT: 11.9 % (ref 11.5–14.5)
EST. GFR  (NO RACE VARIABLE): >60 ML/MIN/1.73 M^2
GLUCOSE SERPL-MCNC: 88 MG/DL (ref 70–110)
GLUCOSE UR QL STRIP: NEGATIVE
HCT VFR BLD AUTO: 46.3 % (ref 40–54)
HCV AB SERPL QL IA: NORMAL
HGB BLD-MCNC: 16 G/DL (ref 14–18)
HGB UR QL STRIP: NEGATIVE
HIV 1+2 AB+HIV1 P24 AG SERPL QL IA: NORMAL
HYALINE CASTS UR QL AUTO: 0 /LPF
IMM GRANULOCYTES # BLD AUTO: 0.02 K/UL (ref 0–0.04)
IMM GRANULOCYTES NFR BLD AUTO: 0.4 % (ref 0–0.5)
KETONES UR QL STRIP: NEGATIVE
LEUKOCYTE ESTERASE UR QL STRIP: NEGATIVE
LYMPHOCYTES # BLD AUTO: 0.6 K/UL (ref 1–4.8)
LYMPHOCYTES NFR BLD: 12.7 % (ref 18–48)
MCH RBC QN AUTO: 29.5 PG (ref 27–31)
MCHC RBC AUTO-ENTMCNC: 34.6 G/DL (ref 32–36)
MCV RBC AUTO: 85 FL (ref 82–98)
MICROSCOPIC COMMENT: NORMAL
MONOCYTES # BLD AUTO: 0.7 K/UL (ref 0.3–1)
MONOCYTES NFR BLD: 15.1 % (ref 4–15)
NEUTROPHILS # BLD AUTO: 3.2 K/UL (ref 1.8–7.7)
NEUTROPHILS NFR BLD: 68.9 % (ref 38–73)
NITRITE UR QL STRIP: NEGATIVE
NRBC BLD-RTO: 0 /100 WBC
PH UR STRIP: 6 [PH] (ref 5–8)
PLATELET # BLD AUTO: 235 K/UL (ref 150–450)
PMV BLD AUTO: 9.8 FL (ref 9.2–12.9)
POTASSIUM SERPL-SCNC: 3.8 MMOL/L (ref 3.5–5.1)
PROT SERPL-MCNC: 8 G/DL (ref 6–8.4)
PROT UR QL STRIP: ABNORMAL
RBC # BLD AUTO: 5.42 M/UL (ref 4.6–6.2)
RBC #/AREA URNS AUTO: 0 /HPF (ref 0–4)
SODIUM SERPL-SCNC: 135 MMOL/L (ref 136–145)
SP GR UR STRIP: 1.02 (ref 1–1.03)
SQUAMOUS #/AREA URNS AUTO: 1 /HPF
URN SPEC COLLECT METH UR: ABNORMAL
WBC # BLD AUTO: 4.71 K/UL (ref 3.9–12.7)
WBC #/AREA URNS AUTO: 1 /HPF (ref 0–5)

## 2022-10-25 PROCEDURE — 99284 PR EMERGENCY DEPT VISIT,LEVEL IV: ICD-10-PCS | Mod: ,,, | Performed by: PHYSICIAN ASSISTANT

## 2022-10-25 PROCEDURE — 86803 HEPATITIS C AB TEST: CPT | Performed by: PHYSICIAN ASSISTANT

## 2022-10-25 PROCEDURE — 96374 THER/PROPH/DIAG INJ IV PUSH: CPT

## 2022-10-25 PROCEDURE — 99285 EMERGENCY DEPT VISIT HI MDM: CPT | Mod: 25

## 2022-10-25 PROCEDURE — 63600175 PHARM REV CODE 636 W HCPCS: Performed by: PHYSICIAN ASSISTANT

## 2022-10-25 PROCEDURE — 87389 HIV-1 AG W/HIV-1&-2 AB AG IA: CPT | Performed by: PHYSICIAN ASSISTANT

## 2022-10-25 PROCEDURE — 99284 EMERGENCY DEPT VISIT MOD MDM: CPT | Mod: ,,, | Performed by: PHYSICIAN ASSISTANT

## 2022-10-25 PROCEDURE — 85025 COMPLETE CBC W/AUTO DIFF WBC: CPT | Performed by: PHYSICIAN ASSISTANT

## 2022-10-25 PROCEDURE — 80053 COMPREHEN METABOLIC PANEL: CPT | Performed by: PHYSICIAN ASSISTANT

## 2022-10-25 PROCEDURE — 25500020 PHARM REV CODE 255: Performed by: EMERGENCY MEDICINE

## 2022-10-25 PROCEDURE — 81001 URINALYSIS AUTO W/SCOPE: CPT | Performed by: PHYSICIAN ASSISTANT

## 2022-10-25 RX ORDER — LISINOPRIL 20 MG/1
20 TABLET ORAL DAILY
COMMUNITY

## 2022-10-25 RX ORDER — IBUPROFEN 600 MG/1
600 TABLET ORAL 3 TIMES DAILY PRN
Qty: 21 TABLET | Refills: 0 | Status: SHIPPED | OUTPATIENT
Start: 2022-10-25 | End: 2022-11-01

## 2022-10-25 RX ORDER — KETOROLAC TROMETHAMINE 30 MG/ML
10 INJECTION, SOLUTION INTRAMUSCULAR; INTRAVENOUS
Status: COMPLETED | OUTPATIENT
Start: 2022-10-25 | End: 2022-10-25

## 2022-10-25 RX ADMIN — IOHEXOL 75 ML: 350 INJECTION, SOLUTION INTRAVENOUS at 04:10

## 2022-10-25 RX ADMIN — KETOROLAC TROMETHAMINE 10 MG: 30 INJECTION, SOLUTION INTRAMUSCULAR; INTRAVENOUS at 04:10

## 2022-10-25 NOTE — ED PROVIDER NOTES
Encounter Date: 10/25/2022       History     Chief Complaint   Patient presents with    Inguinal Hernia     The history is provided by the patient and medical records. No  was used.     Bernardo Byrne is a 32 y.o. male with medical history of HTN, Migraine, HLD presenting to the ED with the chief complaint of male  complaint.     Patient experienced a mechanical fall at work 6 days ago while using a hand truck. Reports the hand truck had a load of over 100 pounds on it and that it fell onto his R lower abdomen and R hip. He was evaluated in the ED after the incident and had x-rays of his R hip performed with no evidence of a fracture. Reports continued bruising and pain to his R hip and was evaluated at an occupational health clinic today. Provider at the occupational health clinic noted bruising in the area and was sent to the ED for concerns of complicated hernia. Patient reports the bruising and swelling has improved over the last few days. Denies pain radiating to his testicles, scrotal swelling, dysuria, hematuria, or other urinary difficulties. Reports last BM was today and he has been passing flatulence. No history of abdominal surgeries or hernias.     Review of patient's allergies indicates:   Allergen Reactions    Mushroom Anaphylaxis     Past Medical History:   Diagnosis Date    Hypercholesterolemia     Hypertension     Migraines, neuralgic      Past Surgical History:   Procedure Laterality Date    KNEE SURGERY  2008    left knee     Family History   Problem Relation Age of Onset    Hypertension Mother     Hyperlipidemia Maternal Grandmother     Hypertension Maternal Grandmother     Hypertension Paternal Grandmother      Social History     Tobacco Use    Smoking status: Never    Smokeless tobacco: Never   Substance Use Topics    Alcohol use: Yes     Comment: on occasion    Drug use: No     Review of Systems   Constitutional:  Negative for fever.   HENT:  Negative for sore throat.     Eyes:  Negative for pain.   Respiratory:  Negative for shortness of breath.    Cardiovascular:  Negative for chest pain.   Gastrointestinal:  Positive for abdominal pain. Negative for nausea.   Genitourinary:  Negative for dysuria.   Musculoskeletal:  Negative for back pain.   Skin:  Negative for rash.   Neurological:  Negative for weakness.   Hematological:  Does not bruise/bleed easily.     Physical Exam     Initial Vitals [10/25/22 1447]   BP Pulse Resp Temp SpO2   (!) 188/92 87 18 97.6 °F (36.4 °C) 99 %      MAP       --         Physical Exam    Constitutional: He appears well-developed and well-nourished. He is not diaphoretic. He is easily aroused.   HENT:   Head: Normocephalic and atraumatic.   Mouth/Throat: Oropharynx is clear and moist. No oropharyngeal exudate.   Eyes: EOM and lids are normal. Pupils are equal, round, and reactive to light. No scleral icterus.   Neck: Phonation normal. Neck supple. No stridor present.   Normal range of motion.  Cardiovascular:  Normal rate and regular rhythm.           Pulmonary/Chest: Breath sounds normal. No stridor. No respiratory distress. He has no wheezes. He has no rales.   Abdominal: Abdomen is soft. He exhibits no distension. There is no abdominal tenderness. Hernia confirmed negative in the right inguinal area and confirmed negative in the left inguinal area.   Ecchymosis to R lower inguinal region extending down to R proximal thigh. TTP with guarding over ecchymosis to R lower inguinal region. Soft to palpation. No mass. There is no rebound.   Genitourinary:    Testes normal.   Right testis shows no swelling and no tenderness. Left testis shows no swelling and no tenderness. Circumcised.    Genitourinary Comments: No testicular tenderness or scrotal swelling     Musculoskeletal:         General: No tenderness or edema. Normal range of motion.      Cervical back: Normal range of motion and neck supple.     Neurological: He is alert, oriented to person, place,  and time and easily aroused. He has normal strength. No sensory deficit.   Skin: Skin is warm and dry. No rash noted. No erythema.   Psychiatric: He has a normal mood and affect. His speech is normal.       ED Course   Procedures  Labs Reviewed   CBC W/ AUTO DIFFERENTIAL - Abnormal; Notable for the following components:       Result Value    Lymph # 0.6 (*)     Lymph % 12.7 (*)     Mono % 15.1 (*)     All other components within normal limits   COMPREHENSIVE METABOLIC PANEL - Abnormal; Notable for the following components:    Sodium 135 (*)     All other components within normal limits   URINALYSIS, REFLEX TO URINE CULTURE - Abnormal; Notable for the following components:    Protein, UA 1+ (*)     All other components within normal limits    Narrative:     Specimen Source->Urine   HIV 1 / 2 ANTIBODY    Narrative:     Release to patient->Immediate   HEPATITIS C ANTIBODY    Narrative:     Release to patient->Immediate   URINALYSIS MICROSCOPIC    Narrative:     Specimen Source->Urine          Imaging Results              CT Pelvis With Contrast (Final result)  Result time 10/25/22 16:38:02      Final result by Dennys Talavera MD (10/25/22 16:38:02)                   Impression:      1. Mild induration of the subcutaneous fat overlying the anterior aspect of the right hip, no focal organized collection.  There are a few scattered prominent lymph nodes in the region.  2. Colonic diverticulosis without diverticulitis.  3. The urinary bladder is decompressed however there may be residual wall thickening, correlation with urinalysis recommended to exclude cystitis as warranted.  4. Additional findings above.      Electronically signed by: Dennys Talavera MD  Date:    10/25/2022  Time:    16:38               Narrative:    EXAMINATION:  CT PELVIS WITH  CONTRAST    CLINICAL HISTORY:  Hernia, complicated;Pelvic trauma;Soft tissue mass, groin, deep;R inguinal bruising after fall 6 days ago;    TECHNIQUE:  Axial images of the  pelvis were obtained at 3.75 mm intervals following administration of 75 cc omni 350 IV contrast.  Coronal and sagittal reformatted images were reviewed.    COMPARISON:  Radiograph 10/19/2022    FINDINGS:  The visualized portions of the distal ureters are unremarkable.  The urinary bladder is decompressed noting there may be residual wall thickening.  The prostate is not enlarged.  No significant free fluid in the pelvis.    There are a few scattered colonic diverticula without inflammation.  The terminal ileum and appendix are unremarkable.  The visualized small bowel is grossly unremarkable.  No focal organized pelvic fluid collection.    There are degenerative changes of the bilateral femoroacetabular joints and sacroiliac joints.  The pubic symphysis is intact.  There are degenerative changes of the visualized lumbar spine.  The sacral segments are aligned.  There are scattered sclerotic foci within the right hemipelvis suggesting bone islands.    There are a few scattered prominent bilateral inguinal lymph nodes.  There is soft tissue induration involving the subcutaneous fat overlying the anterior right hip.  No focal organized fluid collection in the region.                                       Medications   ketorolac injection 9.999 mg (9.999 mg Intravenous Given 10/25/22 1609)   iohexoL (OMNIPAQUE 350) injection 75 mL (75 mLs Intravenous Given 10/25/22 1602)     Medical Decision Making:   History:   Old Medical Records: I decided to obtain old medical records.  Old Records Summarized: records from clinic visits and records from previous admission(s).  Clinical Tests:   Lab Tests: Ordered and Reviewed  Radiological Study: Ordered and Reviewed     APC / Resident Notes:   32 y.o. male with medical history of HTN, Migraine, HLD presenting to the ED c/o persistent pain and swelling to his R inguinal region beginning 6 days ago after having a hand truck fall on him at work. DDx includes but not limited to  contusion, hematoma, pelvic fracture, muscular strain, ligament injury, hernia.    CT pelvis showing subcutaneous fat induration over R hip. No focal organized collection. No evidence of active bleeding. No evidence of hernia or bowel obstruction. Labs reviewed without significant findings. UA negative for UTI. Okay for continued outpatient follow-up with occupational medicine. RX for IBU provided. Patient expresses understanding and agreeable to the plan. Return to ED precautions given for new, worsening, or concerning symptoms. I have discussed the care of this patient with my supervising physician.        Attending Attestation:     Physician Attestation Statement for NP/PA:   I have conducted a face to face encounter with this patient in addition to the NP/PA, due to Medical Complexity    Other NP/PA Attestation Additions:    History of Present Illness: Patient was struck by a heavy object and his right inguinal region approximately 6 days ago while at work.  Patient was evaluated in the ER after this injury, and followed up with occupational medicine as instructed.  Upon arrival outpatient medicine today, patient was found to have significant bruising in the area of the trauma, and he was sent here for evaluation of incarcerated hernia.    Medical Decision Making: Examining the patient, his findings are not consistent with incarcerated hernia, but rather he is tender and ecchymotic in the same region that he suffered a blunt trauma.  He has some extravasation of his hematoma inferiorly along his right medial thigh, but there is no tenderness to palpation along there.  Patient had blood work and a CT done that was completely normal.  I do believe his findings although waited to the blunt trauma he can follow up and return to work as as pain permits.                        Clinical Impression:   Final diagnoses:  [S30.1XXA] Contusion of abdominal wall, initial encounter (Primary)  [W19.XXXD] Fall, subsequent  encounter      ED Disposition Condition    Discharge Stable          ED Prescriptions       Medication Sig Dispense Start Date End Date Auth. Provider    ibuprofen (ADVIL,MOTRIN) 600 MG tablet Take 1 tablet (600 mg total) by mouth 3 (three) times daily as needed for Pain. 21 tablet 10/25/2022 11/1/2022 Mario Olvera PA-C          Follow-up Information       Follow up With Specialties Details Why Contact Info    Hamzah Miller MD Family Medicine   [address] [place] [address] DR Kenna PERRIN [id number]  [phone]      [facility] [address]  [phone]             Mario Olvera PA-C  10/25/22 1801       Porfirio Desouza III, MD  10/25/22 7349

## 2022-10-25 NOTE — DISCHARGE INSTRUCTIONS
No acute intra-abdominal injuries were seen on your CT imaging. No evidence of hernia or fracture. Continue Ibuprofen and ice packs for further pain control.     You may use the below contact information to obtain follow-up with occupational health here at [facility].     Return to the emergency room for new, worsening, or concerning symptoms.

## 2022-10-25 NOTE — ED TRIAGE NOTES
Bernardo Byrne, a 32 y.o. male presents to the ED w/ complaint of right sided inguinal hernia. Symptoms started about a week ago, was evaluated.     Pt stated that pain is worse.     Triage note:  Chief Complaint   Patient presents with    Inguinal Hernia     Review of patient's allergies indicates:   Allergen Reactions    Mushroom Anaphylaxis     Past Medical History:   Diagnosis Date    Hypercholesterolemia     Hypertension     Migraines, neuralgic

## 2022-10-25 NOTE — Clinical Note
"Bernardo Medina"Caty was seen and treated in our emergency department on 10/25/2022.  He may return to work on 10/26/2022.       If you have any questions or concerns, please don't hesitate to call.      Mario Olvera PA-C"

## 2024-06-08 ENCOUNTER — HOSPITAL ENCOUNTER (EMERGENCY)
Facility: HOSPITAL | Age: 34
Discharge: HOME | End: 2024-06-08
Attending: SURGERY

## 2024-06-08 VITALS
RESPIRATION RATE: 18 BRPM | DIASTOLIC BLOOD PRESSURE: 93 MMHG | SYSTOLIC BLOOD PRESSURE: 177 MMHG | HEIGHT: 75 IN | OXYGEN SATURATION: 99 % | BODY MASS INDEX: 37.62 KG/M2 | HEART RATE: 84 BPM | WEIGHT: 302.56 LBS | TEMPERATURE: 98 F

## 2024-06-08 DIAGNOSIS — J02.9 SORE THROAT: Primary | ICD-10-CM

## 2024-06-08 LAB
GROUP A STREP, MOLECULAR: NEGATIVE
INFLUENZA A, MOLECULAR: NEGATIVE
INFLUENZA B, MOLECULAR: NEGATIVE
SARS-COV-2 RDRP RESP QL NAA+PROBE: NEGATIVE
SPECIMEN SOURCE: NORMAL

## 2024-06-08 PROCEDURE — 99284 EMERGENCY DEPT VISIT MOD MDM: CPT | Mod: 25

## 2024-06-08 PROCEDURE — 63600175 PHARM REV CODE 636 W HCPCS: Performed by: SURGERY

## 2024-06-08 PROCEDURE — 87651 STREP A DNA AMP PROBE: CPT | Performed by: SURGERY

## 2024-06-08 PROCEDURE — 87502 INFLUENZA DNA AMP PROBE: CPT | Performed by: SURGERY

## 2024-06-08 PROCEDURE — U0002 COVID-19 LAB TEST NON-CDC: HCPCS | Performed by: SURGERY

## 2024-06-08 PROCEDURE — 96372 THER/PROPH/DIAG INJ SC/IM: CPT | Performed by: SURGERY

## 2024-06-08 RX ORDER — CEFTRIAXONE 1 G/1
1 INJECTION, POWDER, FOR SOLUTION INTRAMUSCULAR; INTRAVENOUS
Status: COMPLETED | OUTPATIENT
Start: 2024-06-08 | End: 2024-06-08

## 2024-06-08 RX ORDER — PREDNISONE 20 MG/1
40 TABLET ORAL DAILY
Qty: 10 TABLET | Refills: 0 | Status: SHIPPED | OUTPATIENT
Start: 2024-06-08 | End: 2024-06-13

## 2024-06-08 RX ORDER — AMOXICILLIN 875 MG/1
875 TABLET, FILM COATED ORAL 2 TIMES DAILY
Qty: 14 TABLET | Refills: 0 | Status: SHIPPED | OUTPATIENT
Start: 2024-06-08 | End: 2024-06-15

## 2024-06-08 RX ORDER — METHYLPREDNISOLONE SOD SUCC 125 MG
125 VIAL (EA) INJECTION
Status: COMPLETED | OUTPATIENT
Start: 2024-06-08 | End: 2024-06-08

## 2024-06-08 RX ADMIN — METHYLPREDNISOLONE SODIUM SUCCINATE 125 MG: 125 INJECTION, POWDER, FOR SOLUTION INTRAMUSCULAR; INTRAVENOUS at 11:06

## 2024-06-08 RX ADMIN — CEFTRIAXONE SODIUM 1 G: 1 INJECTION, POWDER, FOR SOLUTION INTRAMUSCULAR; INTRAVENOUS at 11:06

## 2024-06-09 NOTE — ED PROVIDER NOTES
Encounter Date: 6/8/2024       History     Chief Complaint   Patient presents with    Sore Throat     Pt to ED with c/o sore throat and hoarseness that began this AM.      History of Present Illness  Bernardo Byrne is a 34 y.o. male that presents with a sore throat today  Patient with a sore throat today with no stridor or drooling on ER eval  Normal phonation & normal swallowing, no peritonsillar abscess noted  No dysphagia, no trismus, patent airway with no angioedema noted  No signs of retropharyngeal infection, no fever on triage this evening    The history is provided by the patient.     Review of patient's allergies indicates:   Allergen Reactions    Mushroom Anaphylaxis     Past Medical History:   Diagnosis Date    Hypercholesterolemia     Hypertension     Migraines, neuralgic      Past Surgical History:   Procedure Laterality Date    KNEE SURGERY  2008    left knee     Family History   Problem Relation Name Age of Onset    Hypertension Mother      Hyperlipidemia Maternal Grandmother      Hypertension Maternal Grandmother      Hypertension Paternal Grandmother       Social History     Tobacco Use    Smoking status: Never    Smokeless tobacco: Never   Substance Use Topics    Alcohol use: Yes     Comment: on occasion    Drug use: No     Review of Systems   Constitutional:  Negative for activity change, appetite change, fatigue, fever and unexpected weight change.   HENT:  Positive for sore throat. Negative for congestion, ear pain, mouth sores, nosebleeds, rhinorrhea, sinus pressure and sneezing.    Eyes:  Negative for pain, discharge, redness and itching.   Respiratory:  Negative for apnea, cough, chest tightness and shortness of breath.    Cardiovascular:  Negative for chest pain, palpitations and leg swelling.   Gastrointestinal:  Negative for abdominal distention, abdominal pain, anal bleeding, constipation, diarrhea, nausea and vomiting.   Endocrine: Negative.    Genitourinary:  Negative for dysuria,  enuresis, flank pain and frequency.   Musculoskeletal:  Negative for arthralgias, back pain, neck pain and neck stiffness.   Skin:  Negative for color change and wound.   Allergic/Immunologic: Negative.    Neurological:  Negative for dizziness, tremors, syncope, facial asymmetry, speech difficulty, weakness, light-headedness, numbness and headaches.   Hematological:  Negative for adenopathy. Does not bruise/bleed easily.   Psychiatric/Behavioral:  Negative for agitation, behavioral problems, hallucinations, self-injury and suicidal ideas. The patient is not nervous/anxious.        Physical Exam     Initial Vitals [06/08/24 2248]   BP Pulse Resp Temp SpO2   (!) 177/93 84 18 98.4 °F (36.9 °C) 99 %      MAP       --         Physical Exam    Nursing note and vitals reviewed.  Constitutional: Vital signs are normal. He appears well-developed and well-nourished. He is cooperative.   HENT:   Head: Normocephalic and atraumatic.   Mouth/Throat: Uvula is midline and mucous membranes are normal.   (+) mild pharyngitis without tonsillitis or exudate   Eyes: Conjunctivae, EOM and lids are normal. Pupils are equal, round, and reactive to light.   Neck: Neck supple.   Normal range of motion.   Full passive range of motion without pain.     Cardiovascular:  Normal rate, regular rhythm, S1 normal, S2 normal, normal heart sounds, intact distal pulses and normal pulses.           Pulmonary/Chest: Effort normal and breath sounds normal.   Abdominal: Abdomen is soft and flat. Bowel sounds are normal.   Musculoskeletal:         General: Normal range of motion.      Cervical back: Full passive range of motion without pain, normal range of motion and neck supple.     Neurological: He is alert and oriented to person, place, and time. He has normal strength.   Skin: Skin is warm, dry and intact. Capillary refill takes less than 2 seconds.         ED Course   Procedures  Labs Reviewed   INFLUENZA A & B BY MOLECULAR   GROUP A STREP, MOLECULAR    SARS-COV-2 RNA AMPLIFICATION, QUAL          Imaging Results    None          Medications   cefTRIAXone injection 1 g (1 g Intramuscular Given 6/8/24 2341)   methylPREDNISolone sodium succinate injection 125 mg (125 mg Intramuscular Given 6/8/24 2341)     Medical Decision Making  34-year-old male with sore throat with no cough or congestion on ER evaluation  Differential: flu, strep, COVID, bronchitis, pneumonia, URI, virus, otitis media    Problems Addressed:  Sore throat: complicated acute illness or injury    Amount and/or Complexity of Data Reviewed  Labs: ordered. Decision-making details documented in ED Course.    ED Management & Risks of Complication, Morbidity, & Mortality:  (-) swabs in the ER with antibiotic & steroid injection in the ED  Prescriptions on discharge, normal swallowing on ED discharge  Afebrile with good stable vital signs, PCP follow-up in 48 hours  Pt/Family counseled to return to the ER with any concerns    Need for Hospitalization or Surgery with Social Determinants of Health:  This patient does not need to be hospitalized on ER evaluation today  The patient's diagnosis is not limited by social determinants of health  Does not require surgery or procedure (major or minor), no risk factors    Clinical Impression:  Final diagnoses:  [J02.9] Sore throat (Primary)          ED Disposition Condition    Discharge Stable          ED Prescriptions       Medication Sig Dispense Start Date End Date Auth. Provider    amoxicillin (AMOXIL) 875 MG tablet Take 1 tablet (875 mg total) by mouth 2 (two) times daily. for 7 days 14 tablet 6/8/2024 6/15/2024 Sumanth Hutchinson MD    predniSONE (DELTASONE) 20 MG tablet Take 2 tablets (40 mg total) by mouth once daily. for 5 days 10 tablet 6/8/2024 6/13/2024 Sumanth Hutchinson MD          Follow-up Information       Follow up With Specialties Details Why Contact Info    Hamzah Miller MD Family Medicine Schedule an appointment as soon as possible for a  visit in 2 days  [address] [place] [address] DR Kenna PERRIN [address]  [phone]               Sumanth Hutchinson MD  06/08/24 1845